# Patient Record
Sex: MALE | Race: WHITE
[De-identification: names, ages, dates, MRNs, and addresses within clinical notes are randomized per-mention and may not be internally consistent; named-entity substitution may affect disease eponyms.]

---

## 2017-04-04 ENCOUNTER — HOSPITAL ENCOUNTER (OUTPATIENT)
Dept: HOSPITAL 47 - ORWHC2ENDO | Age: 52
Discharge: HOME | End: 2017-04-04
Payer: MEDICARE

## 2017-04-04 VITALS — TEMPERATURE: 98.1 F

## 2017-04-04 VITALS — SYSTOLIC BLOOD PRESSURE: 107 MMHG | RESPIRATION RATE: 16 BRPM | HEART RATE: 64 BPM | DIASTOLIC BLOOD PRESSURE: 77 MMHG

## 2017-04-04 VITALS — BODY MASS INDEX: 27.5 KG/M2

## 2017-04-04 DIAGNOSIS — K57.30: Primary | ICD-10-CM

## 2017-04-04 DIAGNOSIS — I10: ICD-10-CM

## 2017-04-04 DIAGNOSIS — K64.8: ICD-10-CM

## 2017-04-04 DIAGNOSIS — K20.9: ICD-10-CM

## 2017-04-04 DIAGNOSIS — Z79.82: ICD-10-CM

## 2017-04-04 DIAGNOSIS — E78.5: ICD-10-CM

## 2017-04-04 DIAGNOSIS — K44.9: ICD-10-CM

## 2017-04-04 DIAGNOSIS — F39: ICD-10-CM

## 2017-04-04 DIAGNOSIS — K29.50: ICD-10-CM

## 2017-04-04 DIAGNOSIS — Z79.899: ICD-10-CM

## 2017-04-04 DIAGNOSIS — Z79.891: ICD-10-CM

## 2017-04-04 PROCEDURE — 88342 IMHCHEM/IMCYTCHM 1ST ANTB: CPT

## 2017-04-04 PROCEDURE — 88305 TISSUE EXAM BY PATHOLOGIST: CPT

## 2017-04-04 PROCEDURE — 45385 COLONOSCOPY W/LESION REMOVAL: CPT

## 2017-04-04 PROCEDURE — 43239 EGD BIOPSY SINGLE/MULTIPLE: CPT

## 2017-04-04 NOTE — P.PCN
Date of Procedure: 04/04/17


Procedure(s) Performed: 


Procedure: 1. Esophagogastroduodenoscopy and biopsy.  2. Colonoscopy and 

polypectomy.





Preoperative diagnosis: Rectal bleeding and epigastric pain.





Postoperative diagnosis: 1. Sliding hiatal hernia with LA grade B distal 

esophagitis.  2. Mild antral gastritis.  3. Sigmoid polyp snared but no large 

polyps or cancer.  4. Sigmoid diverticulosis with no evidence of acute 

diverticulitis or strictures.  5. Low-grade internal hemorrhoids without 

bleeding at the time of this exam.





Preparation: HalfLytely prep.





Sedation: Was provided by anesthesia.





Brief clinical history: The patient is a 52-year-old male who is referred for 

this evaluation for the above reasons.  He has been having intermittent rectal 

bleeding, mostly when he has hard bowel movements which is usual for him 

secondary to his back pain medications.  He also reported some issues with 

reflux and epigastric pain lately.  His brother, who is 6 years older, had 

polyps but there is no family history of colon cancer.  This would be the 

patient's first upper and lower endoscopy.





Procedure: With the patient on his left lateral decubitus position and after 

informed consent and adequate sedation, I passed the Olympus- video 

upper endoscope through the cricopharyngeus down the esophagus.  GE junction 

was around 42-43 cm from the incisors and there was a sliding hiatal hernia 

around 2 cm in size.  The distal esophagus showed multiple linear erosions 

terminating at the level of the GE junction consistent with LA grade B distal 

esophagitis.  There were no strictures or Horvath's esophagus.  The endoscope 

was then passed into the stomach which was insufflated with air and inspected 

in detail including the retroflex view in the cardia.  There was some mottling 

and erythema in the antrum consistent with mild gastritis but no ulcers or 

erosions.  Pyloric channel, duodenal bulb, post bulbar area and descending 

duodenum appeared within normal limits.  Because of his symptoms I obtained 

biopsies from the duodenum, antrum and esophagus then the endoscope was 

withdrawn and I proceeded with the colonoscopy.





Perianal area did not show any fissures or fistulas.  There were no masses felt 

on digital rectal examination.  The Olympus CFQ 160L video colonoscope was then 

inserted in the rectum in the usual fashion and advanced to the cecum.  There 

were a few diverticular orifices seen scattered in the sigmoid with no evidence 

of acute diverticulitis or strictures.  There was a pedunculated polyp in the 

proximal sigmoid which was snared and retrieved by suction but there were no 

large polyps or cancer.  I retroflexed endoscope in the rectum before the 

endoscope was withdrawn.  Low-grade internal hemorrhoids were noted but there 

was no evidence of bleeding.





The patient tolerated the procedure well.





Plan: The patient was reassured.  Discussed dietary measures and local care for 

hemorrhoids.  Will await biopsy results.  I anticipate repeating his 

colonoscopy in 5 years.  He will follow up with you as planned.

## 2021-06-25 ENCOUNTER — HOSPITAL ENCOUNTER (OUTPATIENT)
Dept: HOSPITAL 47 - RADCTMAIN | Age: 56
Discharge: HOME | End: 2021-06-25
Attending: FAMILY MEDICINE
Payer: MEDICARE

## 2021-06-25 DIAGNOSIS — R13.10: Primary | ICD-10-CM

## 2021-06-25 PROCEDURE — 70450 CT HEAD/BRAIN W/O DYE: CPT

## 2021-06-25 PROCEDURE — 74230 X-RAY XM SWLNG FUNCJ C+: CPT

## 2021-06-25 NOTE — CT
EXAMINATION TYPE: CT brain wo con

 

DATE OF EXAM: 6/25/2021

 

COMPARISON: None

 

INDICATION: Pt states he feels a lump/soft spot on top of his head

 

DLP: 1263 mGycm, Automated exposure control for dose reduction was used.

 

CONTRAST: None

 

CT of the brain is performed utilizing 3 mm thick sections through the posterior fossa and 3 mm thick
 sections through the remaining calvarium.  Study is performed within 24 hours of arrival to the hosp
ital. 

 

No abnormal hyperdensity is present to suggest an acute intracranial hemorrhage.

No mass lesion is evident.

No acute infarcts are evident.

Ventricles and sulci are appropriate for the patient age.  

Paranasal sinuses and mastoid air cells within the field-of-view are clear.

 

IMPRESSIONS:

1.   No acute intracranial process.

2. No suspicious abnormalities to account for patient's reported palpable abnormality.

## 2023-09-20 ENCOUNTER — HOSPITAL ENCOUNTER (OUTPATIENT)
Dept: HOSPITAL 47 - RADUSWWP | Age: 58
Discharge: HOME | End: 2023-09-20
Attending: FAMILY MEDICINE
Payer: MEDICARE

## 2023-09-20 DIAGNOSIS — R19.06: ICD-10-CM

## 2023-09-20 DIAGNOSIS — K82.8: ICD-10-CM

## 2023-09-20 DIAGNOSIS — K76.89: Primary | ICD-10-CM

## 2023-09-20 PROCEDURE — 76705 ECHO EXAM OF ABDOMEN: CPT

## 2023-09-20 NOTE — US
EXAMINATION TYPE: US abdomen limited

 

DATE OF EXAM: 9/20/2023

 

COMPARISON: NONE

 

CLINICAL INDICATION: Male, 58 years old with history of R19.06 EPIGASTRIC SWELLING, MASS OR LUMP; Pt 
states palpable/swelling epigastric area x few weeks/ denies pain

 

TECHNIQUE: Multiple sonographic images of the right upper quadrant are obtained.

 

FINDINGS:

 

EXAM MEASUREMENTS:

 

Liver Length:  16.6 cm   

Gallbladder Wall:  0.3 cm   

CBD:  0.5 cm

Right Kidney:  11.5 x 4.5 x 5.2 cm

 

SONOGRAPHER NOTES:

 

Pancreas:  3mm panc duct visualized, tail obscured by overlying bowel gas

Liver:  Multiple cystic lesions scattered throughout liver, largest within left lobe= 3.3 x 3.2 x 3.4
 cm   

Gallbladder:  Wall thickness upper limits of normal

**Evidence for sonographic Martel's sign:  No

CBD:  wnl 

Right Kidney:  lower pole gassed out 

 

**Epigastric area scanned in area of pt's palpable/swelling- no abnormality visualized at this time**


 

IMPRESSION: 

 

1. Hepatic cysts. The largest appears to be simple and measures 3.3 cm.

2. Pancreatic duct somewhat prominent within the body measuring 0.3 cm. Normal less than 0.2 cm. Cons
ider follow-up ERCP.

3. Gallbladder wall thickening 0.3 cm. Consider cholecystitis.

## 2023-09-26 NOTE — FL
EXAMINATION TYPE: FL barium swallow w video

 

DATE OF EXAM: 6/25/2021

 

COMPARISON: NONE

 

HISTORY: Dysphagia, food getting stuck.

 

TECHNIQUE: Fluoroscopy.

 

FINDINGS:  Fluoroscopic guidance was provided for the procedure performed in conjunction with the [facility] pathology department.  Please see complete report forthcoming from the Speech Pathology departmen
t.  Various consistencies from thin liquid to solids were administered.

 

Fluoroscopy time 1 minute 52 seconds.

Number of images: 0.

 

No aspiration or penetration was evident.

No significant pooling was observed in the vallecula.

There was normal propulsion of the bolus.

 

IMPRESSION: 

1. Normal modified barium swallow.
None

## 2023-10-16 ENCOUNTER — HOSPITAL ENCOUNTER (OUTPATIENT)
Dept: HOSPITAL 47 - RADXRYALE | Age: 58
Discharge: HOME | End: 2023-10-16
Attending: FAMILY MEDICINE
Payer: MEDICARE

## 2023-10-16 DIAGNOSIS — M79.672: ICD-10-CM

## 2023-10-16 DIAGNOSIS — M79.89: Primary | ICD-10-CM

## 2023-10-16 NOTE — XR
EXAMINATION TYPE: XR foot complete LT

 

DATE OF EXAM: 10/16/2023

 

COMPARISON: None

 

HISTORY: Swelling under ball of foot times one month

 

TECHNIQUE: 3 view left foot

 

FINDINGS: No acute fracture or dislocation is evident. Joint spaces are preserved. Soft tissues appea
r normal. No obvious swelling evident. No radiopaque foreign bodies evident.

 

Follow up exams can be performed 7-10 days from acute trauma for continued pain.

 

IMPRESSION:

1.  No acute osseous abnormality.

2. No definite abnormality to correspond to soft tissue swelling ball of foot.